# Patient Record
Sex: FEMALE | Race: WHITE | NOT HISPANIC OR LATINO | Employment: UNEMPLOYED | URBAN - METROPOLITAN AREA
[De-identification: names, ages, dates, MRNs, and addresses within clinical notes are randomized per-mention and may not be internally consistent; named-entity substitution may affect disease eponyms.]

---

## 2023-03-06 ENCOUNTER — HOSPITAL ENCOUNTER (EMERGENCY)
Facility: HOSPITAL | Age: 33
Discharge: HOME | End: 2023-03-06
Attending: EMERGENCY MEDICINE | Admitting: EMERGENCY MEDICINE
Payer: COMMERCIAL

## 2023-03-06 VITALS
HEIGHT: 64 IN | RESPIRATION RATE: 18 BRPM | HEART RATE: 83 BPM | SYSTOLIC BLOOD PRESSURE: 127 MMHG | WEIGHT: 153.66 LBS | OXYGEN SATURATION: 97 % | BODY MASS INDEX: 26.23 KG/M2 | DIASTOLIC BLOOD PRESSURE: 75 MMHG | TEMPERATURE: 98.4 F

## 2023-03-06 DIAGNOSIS — E61.1 LOW IRON: Primary | ICD-10-CM

## 2023-03-06 LAB
ALBUMIN SERPL-MCNC: 4.7 G/DL (ref 3.5–5.2)
ALBUMIN/GLOB SERPL: 1.7 G/DL
ALP SERPL-CCNC: 71 U/L (ref 39–117)
ALT SERPL W P-5'-P-CCNC: 19 U/L (ref 1–33)
ANION GAP SERPL CALCULATED.3IONS-SCNC: 10.8 MMOL/L (ref 5–15)
AST SERPL-CCNC: 21 U/L (ref 1–32)
BASOPHILS # BLD AUTO: 0.04 10*3/MM3 (ref 0–0.2)
BASOPHILS NFR BLD AUTO: 0.8 % (ref 0–1.5)
BILIRUB SERPL-MCNC: 0.2 MG/DL (ref 0–1.2)
BILIRUB UR QL STRIP: NEGATIVE
BUN SERPL-MCNC: 6 MG/DL (ref 6–20)
BUN/CREAT SERPL: 7.4 (ref 7–25)
CALCIUM SPEC-SCNC: 9.9 MG/DL (ref 8.6–10.5)
CHLORIDE SERPL-SCNC: 105 MMOL/L (ref 98–107)
CK SERPL-CCNC: 42 U/L (ref 20–180)
CLARITY UR: CLEAR
CO2 SERPL-SCNC: 25.2 MMOL/L (ref 22–29)
COLOR UR: YELLOW
CREAT SERPL-MCNC: 0.81 MG/DL (ref 0.57–1)
CRP SERPL-MCNC: <0.3 MG/DL (ref 0–0.5)
D-LACTATE SERPL-SCNC: 1.1 MMOL/L (ref 0.5–2)
DEPRECATED RDW RBC AUTO: 38.5 FL (ref 37–54)
EGFRCR SERPLBLD CKD-EPI 2021: 98.4 ML/MIN/1.73
EOSINOPHIL # BLD AUTO: 0.01 10*3/MM3 (ref 0–0.4)
EOSINOPHIL NFR BLD AUTO: 0.2 % (ref 0.3–6.2)
ERYTHROCYTE [DISTWIDTH] IN BLOOD BY AUTOMATED COUNT: 11.9 % (ref 12.3–15.4)
ERYTHROCYTE [SEDIMENTATION RATE] IN BLOOD: 2 MM/HR (ref 0–20)
FERRITIN SERPL-MCNC: 37.98 NG/ML (ref 13–150)
GLOBULIN UR ELPH-MCNC: 2.7 GM/DL
GLUCOSE SERPL-MCNC: 110 MG/DL (ref 65–99)
GLUCOSE UR STRIP-MCNC: NEGATIVE MG/DL
HCT VFR BLD AUTO: 39 % (ref 34–46.6)
HGB BLD-MCNC: 13.2 G/DL (ref 12–15.9)
HGB UR QL STRIP.AUTO: NEGATIVE
HOLD SPECIMEN: 11
HOLD SPECIMEN: 11
IMM GRANULOCYTES # BLD AUTO: 0.02 10*3/MM3 (ref 0–0.05)
IMM GRANULOCYTES NFR BLD AUTO: 0.4 % (ref 0–0.5)
IRON 24H UR-MRATE: 32 MCG/DL (ref 37–145)
IRON SATN MFR SERPL: 9 % (ref 20–50)
KETONES UR QL STRIP: NEGATIVE
LEUKOCYTE ESTERASE UR QL STRIP.AUTO: NEGATIVE
LIPASE SERPL-CCNC: 42 U/L (ref 13–60)
LYMPHOCYTES # BLD AUTO: 1.34 10*3/MM3 (ref 0.7–3.1)
LYMPHOCYTES NFR BLD AUTO: 28.2 % (ref 19.6–45.3)
MCH RBC QN AUTO: 30.3 PG (ref 26.6–33)
MCHC RBC AUTO-ENTMCNC: 33.8 G/DL (ref 31.5–35.7)
MCV RBC AUTO: 89.7 FL (ref 79–97)
MONOCYTES # BLD AUTO: 0.22 10*3/MM3 (ref 0.1–0.9)
MONOCYTES NFR BLD AUTO: 4.6 % (ref 5–12)
NEUTROPHILS NFR BLD AUTO: 3.12 10*3/MM3 (ref 1.7–7)
NEUTROPHILS NFR BLD AUTO: 65.8 % (ref 42.7–76)
NITRITE UR QL STRIP: NEGATIVE
NRBC BLD AUTO-RTO: 0 /100 WBC (ref 0–0.2)
PH UR STRIP.AUTO: 6 [PH] (ref 5–8)
PLATELET # BLD AUTO: 324 10*3/MM3 (ref 140–450)
PMV BLD AUTO: 10.6 FL (ref 6–12)
POTASSIUM SERPL-SCNC: 3.7 MMOL/L (ref 3.5–5.2)
PROT SERPL-MCNC: 7.4 G/DL (ref 6–8.5)
PROT UR QL STRIP: NEGATIVE
RBC # BLD AUTO: 4.35 10*6/MM3 (ref 3.77–5.28)
SODIUM SERPL-SCNC: 141 MMOL/L (ref 136–145)
SP GR UR STRIP: 1.01 (ref 1–1.03)
T4 FREE SERPL-MCNC: 1.19 NG/DL (ref 0.93–1.7)
TIBC SERPL-MCNC: 364 MCG/DL (ref 298–536)
TRANSFERRIN SERPL-MCNC: 244 MG/DL (ref 200–360)
TSH SERPL DL<=0.05 MIU/L-ACNC: 0.58 UIU/ML (ref 0.27–4.2)
UROBILINOGEN UR QL STRIP: NORMAL
VIT B12 BLD-MCNC: 326 PG/ML (ref 211–946)
WBC NRBC COR # BLD: 4.75 10*3/MM3 (ref 3.4–10.8)
WHOLE BLOOD HOLD COAG: 11
WHOLE BLOOD HOLD SPECIMEN: 11

## 2023-03-06 PROCEDURE — 81003 URINALYSIS AUTO W/O SCOPE: CPT | Performed by: NURSE PRACTITIONER

## 2023-03-06 PROCEDURE — 99283 EMERGENCY DEPT VISIT LOW MDM: CPT

## 2023-03-06 PROCEDURE — 82550 ASSAY OF CK (CPK): CPT | Performed by: NURSE PRACTITIONER

## 2023-03-06 PROCEDURE — 83690 ASSAY OF LIPASE: CPT | Performed by: NURSE PRACTITIONER

## 2023-03-06 PROCEDURE — 85025 COMPLETE CBC W/AUTO DIFF WBC: CPT | Performed by: NURSE PRACTITIONER

## 2023-03-06 PROCEDURE — 84443 ASSAY THYROID STIM HORMONE: CPT | Performed by: NURSE PRACTITIONER

## 2023-03-06 PROCEDURE — 86225 DNA ANTIBODY NATIVE: CPT | Performed by: NURSE PRACTITIONER

## 2023-03-06 PROCEDURE — 83540 ASSAY OF IRON: CPT | Performed by: NURSE PRACTITIONER

## 2023-03-06 PROCEDURE — 86038 ANTINUCLEAR ANTIBODIES: CPT | Performed by: NURSE PRACTITIONER

## 2023-03-06 PROCEDURE — 83605 ASSAY OF LACTIC ACID: CPT | Performed by: NURSE PRACTITIONER

## 2023-03-06 PROCEDURE — 82607 VITAMIN B-12: CPT | Performed by: NURSE PRACTITIONER

## 2023-03-06 PROCEDURE — 85652 RBC SED RATE AUTOMATED: CPT | Performed by: NURSE PRACTITIONER

## 2023-03-06 PROCEDURE — 80053 COMPREHEN METABOLIC PANEL: CPT | Performed by: NURSE PRACTITIONER

## 2023-03-06 PROCEDURE — 25010000002 NA FERRIC GLUC CPLX PER 12.5 MG: Performed by: NURSE PRACTITIONER

## 2023-03-06 PROCEDURE — 84466 ASSAY OF TRANSFERRIN: CPT | Performed by: NURSE PRACTITIONER

## 2023-03-06 PROCEDURE — 82728 ASSAY OF FERRITIN: CPT | Performed by: NURSE PRACTITIONER

## 2023-03-06 PROCEDURE — 96365 THER/PROPH/DIAG IV INF INIT: CPT

## 2023-03-06 PROCEDURE — 36415 COLL VENOUS BLD VENIPUNCTURE: CPT

## 2023-03-06 PROCEDURE — 86140 C-REACTIVE PROTEIN: CPT | Performed by: NURSE PRACTITIONER

## 2023-03-06 PROCEDURE — 84439 ASSAY OF FREE THYROXINE: CPT | Performed by: NURSE PRACTITIONER

## 2023-03-06 RX ADMIN — SODIUM CHLORIDE 125 MG: 9 INJECTION, SOLUTION INTRAVENOUS at 09:42

## 2023-03-06 NOTE — ED PROVIDER NOTES
Time: 7:26 AM EST  Date of encounter:  3/6/2023  Independent Historian/Clinical History and Information was obtained by:   Patient  Chief Complaint: Muscle pain    History is limited by: N/A    History of Present Illness:  Patient is a 32 y.o. year old female who presents to the emergency department for evaluation of atypical complaints stating that she feels like all of her muscles are twisting and they are in the wrong place.  She says she can touch one placed on her body and affect another area on her body.  She said the symptoms have been going on for years and have recently gotten worse.  She denies pain, nausea, vomiting, dysuria, headache.  She is currently menstruating and says that her symptoms always get worse when she is on her period.  She does report that she has been taking D3 and iron.  She said she saw Dr. GLORIA Anne and had to get a blood and iron transfusion.        Patient Care Team  Primary Care Provider: Hugo Anne MD    Past Medical History:     No Known Allergies  Past Medical History:   Diagnosis Date   • Anemia    • Fatigue    • GERD (gastroesophageal reflux disease)    • Hair loss    • Menorrhagia      History reviewed. No pertinent surgical history.  History reviewed. No pertinent family history.    Home Medications:  Prior to Admission medications    Not on File        Social History:   Social History     Tobacco Use   • Smoking status: Former     Packs/day: 1.00     Years: 2.00     Pack years: 2.00     Types: Cigarettes   • Smokeless tobacco: Never   Vaping Use   • Vaping Use: Some days   • Substances: Nicotine, Flavoring   Substance Use Topics   • Alcohol use: Never         Review of Systems:  Review of Systems   Constitutional: Negative for fever.   Respiratory: Negative for shortness of breath.    Cardiovascular: Negative for chest pain.   Gastrointestinal: Negative for diarrhea, nausea and vomiting.   Musculoskeletal: Positive for myalgias.   Neurological: Positive for  "weakness.        Physical Exam:  /75   Pulse 83   Temp 98.4 °F (36.9 °C) (Oral)   Resp 18   Ht 162.6 cm (64\")   Wt 69.7 kg (153 lb 10.6 oz)   SpO2 97%   BMI 26.38 kg/m²     Physical Exam  Constitutional:       General: She is not in acute distress.     Appearance: Normal appearance. She is normal weight. She is not ill-appearing or toxic-appearing.   Cardiovascular:      Rate and Rhythm: Normal rate and regular rhythm.   Pulmonary:      Effort: Pulmonary effort is normal. No respiratory distress.      Breath sounds: Normal breath sounds.   Musculoskeletal:         General: No swelling. Normal range of motion.   Skin:     General: Skin is warm and dry.   Neurological:      General: No focal deficit present.      Mental Status: She is alert and oriented to person, place, and time.   Psychiatric:         Mood and Affect: Mood normal.         Behavior: Behavior normal.                  Procedures:  Procedures      Medical Decision Making:      Comorbidities that affect care:    Chronic fatigue, hair loss, anemia    External Notes reviewed:    Previous Clinic Note: Dr. GLORIA Anne office visit 2020 and Previous Labs: 2021 [facility]      The following orders were placed and all results were independently analyzed by me:  Orders Placed This Encounter   Procedures   • Comprehensive Metabolic Panel   • Lipase   • Urinalysis With Microscopic If Indicated (No Culture) - Urine, Clean Catch   • Thayer Draw   • Lactic Acid, Plasma   • Sedimentation Rate   • C-reactive Protein   • CK   • CBC Auto Differential   • Iron Profile   • Vitamin B12   • TSH   • T4, Free   • MARGARITA   • Ferritin   • CBC & Differential   • Green Top (Gel)   • Lavender Top   • Gold Top - SST   • Light Blue Top       Medications Given in the Emergency Department:  Medications   ferric gluconate (FERRLECIT)125 MG in sodium chloride 0.9 % 100 mL IVPB (0 mg Intravenous Stopped 3/6/23 1042)        ED Course:         Labs:    Lab Results (last 24 " hours)     Procedure Component Value Units Date/Time    Urinalysis With Microscopic If Indicated (No Culture) - Urine, Clean Catch [[id number]]  (Normal) Collected: 03/06/23 0755    Specimen: Urine, Clean Catch Updated: 03/06/23 0844     Color, UA Yellow     Appearance, UA Clear     pH, UA 6.0     Specific Gravity, UA 1.007     Glucose, UA Negative     Ketones, UA Negative     Bilirubin, UA Negative     Blood, UA Negative     Protein, UA Negative     Leuk Esterase, UA Negative     Nitrite, UA Negative     Urobilinogen, UA 0.2 E.U./dL    Narrative:      Urine microscopic not indicated.    CBC & Differential [[id number]]  (Abnormal) Collected: 03/06/23 0826    Specimen: Blood Updated: 03/06/23 0832    Narrative:      The following orders were created for panel order CBC & Differential.  Procedure                               Abnormality         Status                     ---------                               -----------         ------                     CBC Auto Differential[[id number]]        Abnormal            Final result                 Please view results for these tests on the individual orders.    Comprehensive Metabolic Panel [[id number]]  (Abnormal) Collected: 03/06/23 0826    Specimen: Blood Updated: 03/06/23 0848     Glucose 110 mg/dL      BUN 6 mg/dL      Creatinine 0.81 mg/dL      Sodium 141 mmol/L      Potassium 3.7 mmol/L      Chloride 105 mmol/L      CO2 25.2 mmol/L      Calcium 9.9 mg/dL      Total Protein 7.4 g/dL      Albumin 4.7 g/dL      ALT (SGPT) 19 U/L      AST (SGOT) 21 U/L      Alkaline Phosphatase 71 U/L      Total Bilirubin 0.2 mg/dL      Globulin 2.7 gm/dL      A/G Ratio 1.7 g/dL      BUN/Creatinine Ratio 7.4     Anion Gap 10.8 mmol/L      eGFR 98.4 mL/min/1.73     Narrative:      GFR Normal >60  Chronic Kidney Disease <60  Kidney Failure <15      Lipase [[id number]]  (Normal) Collected: 03/06/23 0826    Specimen: Blood Updated: 03/06/23 0848     Lipase 42 U/L     Lactic Acid, Plasma  [[id number]]  (Normal) Collected: 03/06/23 0826    Specimen: Blood Updated: 03/06/23 0849     Lactate 1.1 mmol/L     Sedimentation Rate [[id number]]  (Normal) Collected: 03/06/23 0826    Specimen: Blood Updated: 03/06/23 0839     Sed Rate 2 mm/hr     C-reactive Protein [[id number]]  (Normal) Collected: 03/06/23 0826    Specimen: Blood Updated: 03/06/23 0848     C-Reactive Protein <0.30 mg/dL     CK [[id number]]  (Normal) Collected: 03/06/23 0826    Specimen: Blood Updated: 03/06/23 0848     Creatine Kinase 42 U/L     CBC Auto Differential [[id number]]  (Abnormal) Collected: 03/06/23 0826    Specimen: Blood Updated: 03/06/23 0832     WBC 4.75 10*3/mm3      RBC 4.35 10*6/mm3      Hemoglobin 13.2 g/dL      Hematocrit 39.0 %      MCV 89.7 fL      MCH 30.3 pg      MCHC 33.8 g/dL      RDW 11.9 %      RDW-SD 38.5 fl      MPV 10.6 fL      Platelets 324 10*3/mm3      Neutrophil % 65.8 %      Lymphocyte % 28.2 %      Monocyte % 4.6 %      Eosinophil % 0.2 %      Basophil % 0.8 %      Immature Grans % 0.4 %      Neutrophils, Absolute 3.12 10*3/mm3      Lymphocytes, Absolute 1.34 10*3/mm3      Monocytes, Absolute 0.22 10*3/mm3      Eosinophils, Absolute 0.01 10*3/mm3      Basophils, Absolute 0.04 10*3/mm3      Immature Grans, Absolute 0.02 10*3/mm3      nRBC 0.0 /100 WBC     Iron Profile [[id number]]  (Abnormal) Collected: 03/06/23 0840    Specimen: Blood Updated: 03/06/23 0909     Iron 32 mcg/dL      Iron Saturation 9 %      Transferrin 244 mg/dL      TIBC 364 mcg/dL     Vitamin B12 [[id number]] Collected: 03/06/23 0840    Specimen: Blood Updated: 03/06/23 0845    TSH [[id number]]  (Normal) Collected: 03/06/23 0840    Specimen: Blood Updated: 03/06/23 0914     TSH 0.582 uIU/mL     T4, Free [[id number]]  (Normal) Collected: 03/06/23 0840    Specimen: Blood Updated: 03/06/23 0914     Free T4 1.19 ng/dL     Narrative:      Results may be falsely increased if patient taking Biotin.      Ferritin [[id number]]  (Normal) Collected: 03/06/23  0840    Specimen: Blood Updated: 03/06/23 1039     Ferritin 37.98 ng/mL     Narrative:      <12 ng/mL usually associated with Iron Deficiency Anemia. Above normal range levels may be due to Hepatic and/or Chronic Inflammatory Disease.  Results may be falsely decreased if patient taking Biotin.             Imaging:    No Radiology Exams Resulted Within Past 24 Hours      Differential Diagnosis and Discussion:    Extremity Pain: Differential diagnosis includes but is not limited to soft tissue sprain, tendonitis, tendon injury, dislocation, fracture, deep vein thrombosis, arterial insufficiency, osteoarthritis, bursitis, and ligamentous damage.    All labs were reviewed and interpreted by me.    MDM  Number of Diagnoses or Management Options  Low iron: new and requires workup     Amount and/or Complexity of Data Reviewed  Clinical lab tests: reviewed             Patient Care Considerations:    CONSULT: I considered consulting Neurology, however Emergent consultation was not warranted.      Consultants/Shared Management Plan:    None    Social Determinants of Health:    Patient is independent, reliable, and has access to care.       Disposition and Care Coordination:    Discharged: The patient is suitable and stable for discharge with no need for consideration of observation or admission.    I have explained the patient´s condition, diagnoses and treatment plan based on the information available to me at this time. I have answered questions and addressed any concerns. The patient has a good  understanding of the patient´s diagnosis, condition, and treatment plan as can be expected at this point. The vital signs have been stable. The patient´s condition is stable and appropriate for discharge from the emergency department.      The patient will pursue further outpatient evaluation with the primary care physician or other designated or consulting physician as outlined in the discharge instructions. They are agreeable to  this plan of care and follow-up instructions have been explained in detail. The patient has received these instructions in written format and have expressed an understanding of the discharge instructions. The patient is aware that any significant change in condition or worsening of symptoms should prompt an immediate return to this or the closest emergency department or call to 911.    Final diagnoses:   Low iron        ED Disposition     ED Disposition   Discharge    Condition   Stable    Comment   --             This medical record created using voice recognition software.           Mallory Campbell, TAMI  03/06/23 5445

## 2023-03-07 LAB
DSDNA IGG SERPL IA-ACNC: NEGATIVE [IU]/ML
NUCLEAR IGG SER IA-RTO: NEGATIVE

## 2023-03-30 ENCOUNTER — OFFICE VISIT (OUTPATIENT)
Dept: FAMILY MEDICINE CLINIC | Facility: CLINIC | Age: 33
End: 2023-03-30
Payer: COMMERCIAL

## 2023-03-30 VITALS
SYSTOLIC BLOOD PRESSURE: 126 MMHG | HEIGHT: 64 IN | DIASTOLIC BLOOD PRESSURE: 78 MMHG | OXYGEN SATURATION: 100 % | HEART RATE: 107 BPM | BODY MASS INDEX: 26.31 KG/M2 | RESPIRATION RATE: 20 BRPM | WEIGHT: 154.1 LBS

## 2023-03-30 DIAGNOSIS — E61.1 IRON DEFICIENCY: ICD-10-CM

## 2023-03-30 DIAGNOSIS — N92.0 MENORRHAGIA WITH REGULAR CYCLE: ICD-10-CM

## 2023-03-30 DIAGNOSIS — Z00.00 ANNUAL PHYSICAL EXAM: ICD-10-CM

## 2023-03-30 DIAGNOSIS — M25.50 ARTHRALGIA, UNSPECIFIED JOINT: ICD-10-CM

## 2023-03-30 DIAGNOSIS — M79.10 MUSCLE PAIN: ICD-10-CM

## 2023-03-30 DIAGNOSIS — Z76.89 ESTABLISHING CARE WITH NEW DOCTOR, ENCOUNTER FOR: Primary | ICD-10-CM

## 2023-03-30 RX ORDER — MELOXICAM 7.5 MG/1
7.5 TABLET ORAL DAILY
Qty: 90 TABLET | Refills: 1 | Status: SHIPPED | OUTPATIENT
Start: 2023-03-30

## 2023-03-30 NOTE — PROGRESS NOTES
Ksenia Teixeira presents to [facility] with complaints of generalized muscle pain, knots under skin, feeling as though her skin is pulling, iron deficiency, menorrhagia, and is also here to establish as a new patient.      History of Present Illness  This is a 32-year-old female, past medical history significant for iron deficiency anemia, who presents to clinic today with complaints of generalized muscle pain, knots under her skin, feeling as though her skin is pulling, menorrhagia, and iron deficiency.    Patient states that she has had some really weird experiences over the past 2 years, states that it did start when she had COVID about 2 years ago, is unsure if that is necessarily related, but since that time she has had some really weird sensations in different areas of pain that have occurred throughout her entire body.  Patient states that in different locations of her body, specifically around her right shoulder, and her lower legs, and in her arms she will develop this knot-like area, states that when she goes to press on it she will actually feel the pain in a different location part of her body, and she states it almost feels like her skin/muscles are pulling at each other.  Patient states that for example when she goes to take a deep breath, she can feel it pulling in her back, it is a sharp shooting pain, and states that this happens in different locations of her body.  She just feels like her body is really twisted, there is something not right with it, and every other provider that she is ever told this to his told her that that just does not sound correct and almost made her feel crazy.  She does state that she has had a lot of issues with iron deficiency in the past, has taken oral iron for most of her life, but is having a lot of menorrhagia that seems to make her iron deficiency worse.  She does state that she went to the ER at the beginning of this  month, received an iron infusion, and actually states that it helped with her weird pain sensation that she has been experiencing as well.  States that it did not completely take it away, but she did notice a improvement and almost wonders if that could be connected.  She has been slightly worked up for autoimmune disease via the ER, everything came back negative, but she still just feels like something is not right.    Menorrhagia/iron deficiency: Patient states that for the past 4 months it almost seems if she had a continuous menstrual cycle, states that she will know when her menstrual cycle is, last about 7 days, is really heavy, will have to change her pad every 30 minutes to an hour because of how much she bleeds.  Patient also states that she is got a lot of clots, and she has not had this issue in the past.  States that when she is not during her menstrual cycle, she does still spot periodically as well, so most like her bleeding never completely goes away.  Knows that this is related to her iron deficiency, is currently taking iron supplements twice daily, eating a lot of iron rich foods, but feels like she still just cannot keep up with the loss of iron from her menstrual cycle.  Does have an appointment with OB/GYN, but is not until June, is wondering if she can get anywhere sooner because it has been several years since she has had Pap smear or other gynecological evaluation.  Her menstrual cycle is regular though.    Does not want to have any other vaccines, will get Pap smear via OB/GYN.  Otherwise is up-to-date on other preventative screenings.    Past Medical History:   Diagnosis Date   • Anemia    • Clotting disorder (HCC)    • Fatigue    • GERD (gastroesophageal reflux disease)    • Hair loss    • Headache    • Menorrhagia      No past surgical history on file.    Social History     Socioeconomic History   • Marital status: Single   Tobacco Use   • Smoking status: Former     Packs/day: 1.00      "Years: 2.00     Pack years: 2.00     Types: Cigarettes     Quit date:      Years since quittin.2     Passive exposure: Never   • Smokeless tobacco: Never   Vaping Use   • Vaping Use: Some days   • Substances: Nicotine, Flavoring   Substance and Sexual Activity   • Alcohol use: Never   • Drug use: Never   • Sexual activity: Yes     Partners: Male     Birth control/protection: Condom     Socioeconomic History   • Marital status: Single   Tobacco Use   • Smoking status: Former     Packs/day: 1.00     Years: 2.00     Pack years: 2.00     Types: Cigarettes     Quit date:      Years since quittin.2     Passive exposure: Never   • Smokeless tobacco: Never   Vaping Use   • Vaping Use: Some days   • Substances: Nicotine, Flavoring   Substance and Sexual Activity   • Alcohol use: Never   • Drug use: Never   • Sexual activity: Yes     Partners: Male     Birth control/protection: Condom      Problem Relation Age of Onset   • Arthritis Mother    • COPD Mother    • Arthritis Father    • Asthma Sister    • Asthma Brother    • Cancer Brother         Stage 4 non small cell lung         Objective   Vital Signs:   /78   Pulse 107   Resp 20   Ht 162.6 cm (64\")   Wt 69.9 kg (154 lb 1.6 oz)   SpO2 100%   BMI 26.45 kg/m²     Body mass index is 26.45 kg/m².    All labs, imaging, test results, and specialty provider notes reviewed with patient.       Physical Exam  Vitals reviewed.   Constitutional:       Appearance: Normal appearance.   Cardiovascular:      Rate and Rhythm: Normal rate and regular rhythm.      Pulses: Normal pulses.      Heart sounds: Normal heart sounds.   Pulmonary:      Effort: Pulmonary effort is normal.      Breath sounds: Normal breath sounds.   Neurological:      General: No focal deficit present.      Mental Status: She is alert and oriented to person, place, and time.              Assessment and Plan:  Diagnoses and all orders for this visit:    1. Establishing care with new doctor, " encounter for (Primary)    2. Annual physical exam    3. Iron deficiency  Assessment & Plan:  Discussed with patient to go ahead and start taking her iron supplements 3 times a day, continue with her iron rich foods, as we need to get the menorrhagia straightened out as that will help correct the iron as well.      4. Menorrhagia with regular cycle  Assessment & Plan:  We will go ahead and send OB/GYN for further evaluation, does need Pap smear, also needs full pelvic exam to make sure no other abnormalities could be contributing to patient's persistent bleeding.  May have an benefit from a transvaginal ultrasound, but will defer all of this to OB/GYN.  We will try to get her in with another local OB/GYN sooner than June.    Orders:  -     Ambulatory Referral to Obstetrics / Gynecology    5. Arthralgia, unspecified joint  Assessment & Plan:  Question whether a lot of patient's symptoms could have been related to having COVID back 2 years ago, as it could cause widespread inflammation, but do also wonder if there could be an underlying autoimmune disease that is contributing to her symptoms as well.  Did discuss with patient that unfortunately if you do not catch the auto immune disease during an acute flare with blood work, it may not show up.  Discussed with her that I would go ahead and treat her though, start her on an anti-inflammatory as it will help with the inflammation that I do think contributing to her pain, we can consider getting blood work done if she was to have an acute flare, at that time.    Orders:  -     meloxicam (Mobic) 7.5 MG tablet; Take 1 tablet by mouth Daily.  Dispense: 90 tablet; Refill: 1    6. Muscle pain  -     meloxicam (Mobic) 7.5 MG tablet; Take 1 tablet by mouth Daily.  Dispense: 90 tablet; Refill: 1    Anticipatory Guidelines discussed with patient.    Discussed getting adequate exercise, reduced TV/electronic time, car safety including seat belt use, sexual activity (if  applicable), and smoking/alcohol use (if applicable).    Follow Up:  Return in about 6 months (around 9/30/2023).    Patient was given instructions and counseling regarding her condition or for health maintenance advice. Please see specific information pulled into the AVS if appropriate.

## 2023-03-30 NOTE — ASSESSMENT & PLAN NOTE
Discussed with patient to go ahead and start taking her iron supplements 3 times a day, continue with her iron rich foods, as we need to get the menorrhagia straightened out as that will help correct the iron as well.

## 2023-03-30 NOTE — ASSESSMENT & PLAN NOTE
We will go ahead and send OB/GYN for further evaluation, does need Pap smear, also needs full pelvic exam to make sure no other abnormalities could be contributing to patient's persistent bleeding.  May have an benefit from a transvaginal ultrasound, but will defer all of this to OB/GYN.  We will try to get her in with another local OB/GYN sooner than June.

## 2023-03-30 NOTE — ASSESSMENT & PLAN NOTE
Question whether a lot of patient's symptoms could have been related to having COVID back 2 years ago, as it could cause widespread inflammation, but do also wonder if there could be an underlying autoimmune disease that is contributing to her symptoms as well.  Did discuss with patient that unfortunately if you do not catch the auto immune disease during an acute flare with blood work, it may not show up.  Discussed with her that I would go ahead and treat her though, start her on an anti-inflammatory as it will help with the inflammation that I do think contributing to her pain, we can consider getting blood work done if she was to have an acute flare, at that time.

## 2023-06-08 PROBLEM — N93.9 ABNORMAL UTERINE BLEEDING (AUB): Status: ACTIVE | Noted: 2023-06-08

## 2023-06-08 NOTE — PROGRESS NOTES
"GYN new patient    CC: Abnormal uterine bleeding    Tobacco/Nicotine use:  {YES NO:30635}    HPI:   32 y.o.No obstetric history on file. Contraception or HRT: {APCONTRACEPTIONHRT:70959}  Menses:   q *** days, lasts *** days, changes products q ***hrs on heaviest days.   Pain:  {APPAIN:56307}    {APWWECO (Optional):32287}      History: PMHx, Meds, Allergies, PSHx, Social Hx, and POBHx all reviewed and updated.  PCP:Jessica Roldan APRN      Review of Systems     There were no vitals taken for this visit.    Physical Exam    ASSESSMENT AND PLAN:  {Assessment Well Woman:63885::\"WWE\"}    There are no diagnoses linked to this encounter.    Counseling:     {APWWECOUNSELIN}      {Referral/Consult (Optional):61043}        Follow Up:  No follow-ups on file.    {Time Spent (Optional):41748}    Chelly Chan MD  2023  "

## 2023-06-13 ENCOUNTER — OFFICE VISIT (OUTPATIENT)
Dept: OBSTETRICS AND GYNECOLOGY | Facility: CLINIC | Age: 33
End: 2023-06-13
Payer: COMMERCIAL

## 2023-06-13 ENCOUNTER — PATIENT ROUNDING (BHMG ONLY) (OUTPATIENT)
Dept: OBSTETRICS AND GYNECOLOGY | Facility: CLINIC | Age: 33
End: 2023-06-13

## 2023-06-13 VITALS
HEIGHT: 64 IN | SYSTOLIC BLOOD PRESSURE: 134 MMHG | DIASTOLIC BLOOD PRESSURE: 85 MMHG | WEIGHT: 156.8 LBS | HEART RATE: 67 BPM | BODY MASS INDEX: 26.77 KG/M2

## 2023-06-13 DIAGNOSIS — N89.8 VAGINAL DISCHARGE: ICD-10-CM

## 2023-06-13 DIAGNOSIS — Z12.4 PAP SMEAR FOR CERVICAL CANCER SCREENING: ICD-10-CM

## 2023-06-13 DIAGNOSIS — Z11.3 SCREEN FOR STD (SEXUALLY TRANSMITTED DISEASE): ICD-10-CM

## 2023-06-13 DIAGNOSIS — N93.9 ABNORMAL UTERINE BLEEDING (AUB): Primary | ICD-10-CM

## 2023-06-13 LAB
C TRACH RRNA CVX QL NAA+PROBE: NOT DETECTED
CANDIDA SPECIES: NEGATIVE
DEPRECATED RDW RBC AUTO: 35.4 FL (ref 37–54)
ERYTHROCYTE [DISTWIDTH] IN BLOOD BY AUTOMATED COUNT: 10.8 % (ref 12.3–15.4)
GARDNERELLA VAGINALIS: NEGATIVE
HCT VFR BLD AUTO: 42.4 % (ref 34–46.6)
HGB BLD-MCNC: 14.4 G/DL (ref 12–15.9)
MCH RBC QN AUTO: 30.4 PG (ref 26.6–33)
MCHC RBC AUTO-ENTMCNC: 34 G/DL (ref 31.5–35.7)
MCV RBC AUTO: 89.5 FL (ref 79–97)
N GONORRHOEA RRNA SPEC QL NAA+PROBE: NOT DETECTED
PLATELET # BLD AUTO: 243 10*3/MM3 (ref 140–450)
PMV BLD AUTO: 11.3 FL (ref 6–12)
PROLACTIN SERPL-MCNC: 30.5 NG/ML (ref 4.79–23.3)
RBC # BLD AUTO: 4.74 10*6/MM3 (ref 3.77–5.28)
T VAGINALIS DNA VAG QL PROBE+SIG AMP: NEGATIVE
WBC NRBC COR # BLD: 5.48 10*3/MM3 (ref 3.4–10.8)

## 2023-06-13 PROCEDURE — 87660 TRICHOMONAS VAGIN DIR PROBE: CPT | Performed by: OBSTETRICS & GYNECOLOGY

## 2023-06-13 PROCEDURE — 85027 COMPLETE CBC AUTOMATED: CPT | Performed by: OBSTETRICS & GYNECOLOGY

## 2023-06-13 PROCEDURE — 87624 HPV HI-RISK TYP POOLED RSLT: CPT | Performed by: OBSTETRICS & GYNECOLOGY

## 2023-06-13 PROCEDURE — 87591 N.GONORRHOEAE DNA AMP PROB: CPT | Performed by: OBSTETRICS & GYNECOLOGY

## 2023-06-13 PROCEDURE — 87491 CHLMYD TRACH DNA AMP PROBE: CPT | Performed by: OBSTETRICS & GYNECOLOGY

## 2023-06-13 PROCEDURE — G0123 SCREEN CERV/VAG THIN LAYER: HCPCS | Performed by: OBSTETRICS & GYNECOLOGY

## 2023-06-13 PROCEDURE — 87480 CANDIDA DNA DIR PROBE: CPT | Performed by: OBSTETRICS & GYNECOLOGY

## 2023-06-13 PROCEDURE — 87510 GARDNER VAG DNA DIR PROBE: CPT | Performed by: OBSTETRICS & GYNECOLOGY

## 2023-06-13 PROCEDURE — 84146 ASSAY OF PROLACTIN: CPT | Performed by: OBSTETRICS & GYNECOLOGY

## 2023-06-13 NOTE — PROGRESS NOTES
"Well Woman Visit    CC: Scheduled annual well gyn visit  Chief Complaint   Patient presents with    [facility]    Annual Exam     Pt experiencing heavy, painful, irregular periods for approximately 1 year.       [facility] intake in the past?: No        Contraception:  Condoms States she has completed childbearing    HPI:   32 y.o.   Patient states menses have been increasingly heavy for the past 3 years.  For approximately the last year she says she has bled almost every day of the year but still has 4 days every month where she has heavier bleeding like a heavy menstrual cycle.  At that time she passes large clots and has to wear double protection    PCP: does manage PMHx and preventative labs  History: PMHx, Meds, Allergies, PSHx, Social Hx, and POBHx all reviewed and updated.    Pt has concerns she would like to discuss.    PHYSICAL EXAM:  /85   Pulse 67   Ht 162.6 cm (64\")   Wt 71.1 kg (156 lb 12.8 oz)   LMP 05/15/2023 (Within Days)   BMI 26.91 kg/m²  Not found.  General- NAD, alert and oriented, appropriate  Psych- Normal mood, good memory  Neck- No masses, no thyroid enlargement  CV- Regular rhythm, no murnurs  Resp- CTA to bases, no wheezes  Abdomen- Soft, non distended, non tender, no masses    Breast left-  Bilaterally symmetrical, no masses, non tender, no nipple discharge  Breast right- Bilaterally symmetrical, no masses, non tender, no nipple discharge    External genitalia- Normal female, no lesions  Urethra/meatus- Normal, no masses, non tender  Bladder- Normal, no masses, non tender  Vagina- Normal, no atrophy, no lesions, no discharge.  No blood in the vault   Cvx- Normal, no lesions, no discharge, No cervical motion tenderness does not bleed when touched  Uterus- Normal size, shape & consistency.  Non tender, mobile, & no prolapse  Adnexa- No mass, non tender  Anus/Rectum/Perineum- Not performed    Lymphatic- No palpable neck, axillary, or groin nodes  Ext- No edema, no cyanosis  "   Skin- No lesions, no rashes, no acanthosis nigricans      ASSESSMENT and PLAN:    Diagnoses and all orders for this visit:    1. Abnormal uterine bleeding (AUB) (Primary)  Assessment & Plan:  States menses are increasingly heavy.  States she bleeds daily but will have 4 days/month with very heavy flow with clots.  Symptoms for the past year of the daily bleeding.  Has had heavy periods for the past 3 years  Last 2 months have been a little better  PCP is treating iron deficiency anemia and has had 7 iron infusions over the last 3 years  States she has never had an abnormal pap smear.  No pap smear in 12 years  Is having PCB  Also c/o some mucous-like discharge    Orders:  -     US Pelvis Transvaginal Non OB; Future  -     Prolactin  -     CBC (No Diff)  -     IgP, Aptima HPV    2. Vaginal discharge  Assessment & Plan:  C/o copious mucous-like discharge    Orders:  -     Gardnerella vaginalis, Trichomonas vaginalis, Candida albicans, DNA - Swab, Vagina    3. Screen for STD (sexually transmitted disease)  -     Chlamydia trachomatis, Neisseria gonorrhoeae, PCR - Swab, Cervix    4. Pap smear for cervical cancer screening  -     IgP, Aptima HPV        Preventative:  BREAST HEALTH- Monthly self breast exam importance and how to reviewed. MMG and/or MRI (prn) reviewed per society guidelines and her individual history. Screen: Not medically needed  CERVICAL CANCER Screening- Reviewed current ASCCP guidelines for screening w and wo cotest HPV, age specific.  Screen: Updated today  Smoking status- NON SMOKER  Follow up PCP/Specialist PMHx and Labs      She understands the importance of having any ordered tests to be performed in a timely fashion.  The risks of not performing them include, but are not limited to, advanced cancer stages, bone loss from osteoporosis and/or subsequent increase in morbidity and/or mortality.  She is encouraged to review her results online and/or contact or office if she has questions.      Follow Up:  Return for post ultrasound.            Chelly Chan MD  06/13/2023    [facility]  [facility] [place] [address]  Dept: [phone]  Dept Fax: [phone]  Loc: [phone]  Loc Fax: [phone]

## 2023-06-13 NOTE — ASSESSMENT & PLAN NOTE
States menses are increasingly heavy.  States she bleeds daily but will have 4 days/month with very heavy flow with clots.  Symptoms for the past year of the daily bleeding.  Has had heavy periods for the past 3 years  Last 2 months have been a little better  PCP is treating iron deficiency anemia and has had 7 iron infusions over the last 3 years  States she has never had an abnormal pap smear.  No pap smear in 12 years  Is having PCB  Also c/o some mucous-like discharge

## 2023-06-15 LAB
CYTOLOGIST CVX/VAG CYTO: NORMAL
CYTOLOGY CVX/VAG DOC CYTO: NORMAL
CYTOLOGY CVX/VAG DOC THIN PREP: NORMAL
DX ICD CODE: NORMAL
HIV 1 & 2 AB SER-IMP: NORMAL
HPV I/H RISK 4 DNA CVX QL PROBE+SIG AMP: NEGATIVE
OTHER STN SPEC: NORMAL
STAT OF ADQ CVX/VAG CYTO-IMP: NORMAL

## 2023-06-16 ENCOUNTER — LAB (OUTPATIENT)
Dept: OBSTETRICS AND GYNECOLOGY | Facility: CLINIC | Age: 33
End: 2023-06-16
Payer: COMMERCIAL

## 2023-06-16 DIAGNOSIS — N93.9 ABNORMAL UTERINE BLEEDING (AUB): Primary | ICD-10-CM

## 2023-06-16 LAB — PROLACTIN SERPL-MCNC: 25.1 NG/ML (ref 4.79–23.3)

## 2023-06-16 PROCEDURE — 84146 ASSAY OF PROLACTIN: CPT | Performed by: OBSTETRICS & GYNECOLOGY

## 2023-07-31 ENCOUNTER — OFFICE VISIT (OUTPATIENT)
Dept: OBSTETRICS AND GYNECOLOGY | Facility: CLINIC | Age: 33
End: 2023-07-31
Payer: COMMERCIAL

## 2023-07-31 VITALS
BODY MASS INDEX: 26.61 KG/M2 | WEIGHT: 155 LBS | SYSTOLIC BLOOD PRESSURE: 125 MMHG | DIASTOLIC BLOOD PRESSURE: 80 MMHG | HEART RATE: 80 BPM

## 2023-07-31 DIAGNOSIS — R93.89 THICKENED ENDOMETRIUM: ICD-10-CM

## 2023-07-31 DIAGNOSIS — N93.9 ABNORMAL UTERINE BLEEDING (AUB): Primary | ICD-10-CM

## 2023-07-31 DIAGNOSIS — R79.89 ELEVATED PROLACTIN LEVEL: ICD-10-CM

## 2023-07-31 DIAGNOSIS — G44.209 TENSION-TYPE HEADACHE, NOT INTRACTABLE, UNSPECIFIED CHRONICITY PATTERN: ICD-10-CM

## 2023-07-31 DIAGNOSIS — H53.9 VISUAL CHANGES: ICD-10-CM

## 2023-09-05 ENCOUNTER — HOSPITAL ENCOUNTER (OUTPATIENT)
Dept: MRI IMAGING | Facility: HOSPITAL | Age: 33
Discharge: HOME | End: 2023-09-05
Payer: COMMERCIAL

## 2023-09-05 ENCOUNTER — HOSPITAL ENCOUNTER (OUTPATIENT)
Dept: ULTRASOUND IMAGING | Facility: HOSPITAL | Age: 33
Discharge: HOME | End: 2023-09-05
Payer: COMMERCIAL

## 2023-09-05 DIAGNOSIS — N93.9 ABNORMAL UTERINE BLEEDING (AUB): ICD-10-CM

## 2023-09-05 DIAGNOSIS — R93.89 THICKENED ENDOMETRIUM: ICD-10-CM

## 2023-09-05 DIAGNOSIS — R79.89 ELEVATED PROLACTIN LEVEL: ICD-10-CM

## 2023-09-05 DIAGNOSIS — G44.209 TENSION-TYPE HEADACHE, NOT INTRACTABLE, UNSPECIFIED CHRONICITY PATTERN: ICD-10-CM

## 2023-09-05 PROCEDURE — 0 GADOBENATE DIMEGLUMINE 529 MG/ML SOLUTION: Performed by: OBSTETRICS & GYNECOLOGY

## 2023-09-05 PROCEDURE — 76830 TRANSVAGINAL US NON-OB: CPT

## 2023-09-05 PROCEDURE — 70553 MRI BRAIN STEM W/O & W/DYE: CPT

## 2023-09-05 PROCEDURE — A9577 INJ MULTIHANCE: HCPCS | Performed by: OBSTETRICS & GYNECOLOGY

## 2023-09-05 RX ADMIN — GADOBENATE DIMEGLUMINE 15 ML: 529 INJECTION, SOLUTION INTRAVENOUS at 07:57

## 2023-09-07 NOTE — PROGRESS NOTES
GYN Visit    CC: AUB    HPI:   33 y.o. Contraception or HRT: Contraception:  Condoms    Patient has no new complaints today          History: PMHx, Meds, Allergies, PSHx, Social Hx, and POBHx all reviewed and updated.  Physical Exam   PHYSICAL EXAM:  /74   Pulse 70   Wt 70.3 kg (155 lb)   LMP 2023 Comment: normal flow, lasting 7 days  BMI 26.61 kg/m²   General- NAD, alert and oriented, appropriate  Psych- Normal mood, good memory    Prolactin (2023 09:01)    US Non-ob Transvaginal (2023 08:39)    MRI Brain With & Without Contrast (2023 08:04)    US Non-ob Transvaginal (2023 11:55)      ASSESSMENT AND PLAN:  Diagnoses and all orders for this visit:    1. Abnormal uterine bleeding (AUB) (Primary)  Assessment & Plan:  Pt states there has been no change in her menses and is still having heavy flow      2. Elevated prolactin level  Assessment & Plan:  MRI of brain was normal and pt has been seen by ophthalmology and has a normal eye exam      3. Thickened endometrium  Assessment & Plan:  Pt continues to have a thickened endometrium.  Counseled the pt re: r/b of hysteroscopy/D&C/resection of possible endometrial polyp.  All questions answered.  Pt would like to consider her options and will return for a pre op visit      4. Elevated procalcitonin  Assessment & Plan:  Elevated x 2 but decreasing each time          Counseling: Pt was counseled at length regarding the risks and benefits of surgery.  The risks include but are not limited to the risk of anesthesia, the risk of bleeding, the risk of infection, the risk of injury to the bowel, bladder, ureters and any surrounding structures.  Risks also include possibility of needing future surgery to correct an issue as a result of the first surgery.  All questions were answered and informed consent was obtained.         Follow Up:  Return in about 3 weeks (around 10/3/2023).          Chelly Chan MD  2023

## 2023-09-12 ENCOUNTER — OFFICE VISIT (OUTPATIENT)
Dept: OBSTETRICS AND GYNECOLOGY | Facility: CLINIC | Age: 33
End: 2023-09-12
Payer: COMMERCIAL

## 2023-09-12 VITALS
WEIGHT: 155 LBS | HEART RATE: 70 BPM | BODY MASS INDEX: 26.61 KG/M2 | SYSTOLIC BLOOD PRESSURE: 120 MMHG | DIASTOLIC BLOOD PRESSURE: 74 MMHG

## 2023-09-12 DIAGNOSIS — N93.9 ABNORMAL UTERINE BLEEDING (AUB): Primary | ICD-10-CM

## 2023-09-12 DIAGNOSIS — R79.89 ELEVATED PROCALCITONIN: ICD-10-CM

## 2023-09-12 DIAGNOSIS — R79.89 ELEVATED PROLACTIN LEVEL: ICD-10-CM

## 2023-09-12 DIAGNOSIS — R93.89 THICKENED ENDOMETRIUM: ICD-10-CM

## 2023-09-12 NOTE — ASSESSMENT & PLAN NOTE
Pt continues to have a thickened endometrium.  Counseled the pt re: r/b of hysteroscopy/D&C/resection of possible endometrial polyp.  All questions answered.  Pt would like to consider her options and will return for a pre op visit

## 2023-09-28 ENCOUNTER — TELEPHONE (OUTPATIENT)
Dept: FAMILY MEDICINE CLINIC | Facility: CLINIC | Age: 33
End: 2023-09-28

## 2023-09-28 NOTE — TELEPHONE ENCOUNTER
LVMTCB    Patient missed their appointment scheduled on 9/28/2023 with Jessica Roldan.     Would patient like to be rescheduled?    No show letter sent to patient either via [employer] or mail.     HUB TO SHARE AND READ